# Patient Record
Sex: MALE | URBAN - METROPOLITAN AREA
[De-identification: names, ages, dates, MRNs, and addresses within clinical notes are randomized per-mention and may not be internally consistent; named-entity substitution may affect disease eponyms.]

---

## 2017-10-21 ENCOUNTER — EMERGENCY (EMERGENCY)
Facility: HOSPITAL | Age: 37
LOS: 1 days | Discharge: HOME | End: 2017-10-21
Attending: EMERGENCY MEDICINE | Admitting: EMERGENCY MEDICINE
Payer: SELF-PAY

## 2017-10-21 VITALS
OXYGEN SATURATION: 98 % | TEMPERATURE: 98 F | HEART RATE: 101 BPM | RESPIRATION RATE: 16 BRPM | SYSTOLIC BLOOD PRESSURE: 168 MMHG | DIASTOLIC BLOOD PRESSURE: 123 MMHG

## 2017-10-21 PROCEDURE — 99053 MED SERV 10PM-8AM 24 HR FAC: CPT

## 2017-10-21 PROCEDURE — 99284 EMERGENCY DEPT VISIT MOD MDM: CPT | Mod: 25

## 2017-10-21 NOTE — ED ADULT TRIAGE NOTE - CHIEF COMPLAINT QUOTE
[place] brought in by EMS [place]. Pt A & O X 3, walking with steady gait, states "I fell asleep, I live nearby." PT admits to drinking alcohol tonight. Denies any injuries, pain or discomfort.

## 2017-10-21 NOTE — ED PROVIDER NOTE - OBJECTIVE STATEMENT
37 yo male presents w/ ems [place] intoxicated. pt states that he fell asleep while waiting for the train. no complaints

## 2017-10-25 DIAGNOSIS — F10.129 ALCOHOL ABUSE WITH INTOXICATION, UNSPECIFIED: ICD-10-CM

## 2023-08-07 NOTE — ED ADULT NURSE NOTE - CCCP TRG CHIEF CMPLNT
Hesham Albrecht is a 63 year old male presenting with 24-2 Standard, OCT per Dr. Herman.    Instructions for nails visual field test 24-2 Standard explained to patient. Patient understands.    POH: Chronic angle-closure glaucoma OU, Cataract OU, Refractive error     PMH:  Thyroid     Ocular meds: Timolol  1 drop daily OU last in at   4:30 am    Patient would like communication of their results via:        Cell Phone:   Telephone Information:   Mobile [phone]     Okay to leave a message containing results? Yes       alcohol intoxication